# Patient Record
Sex: MALE | Race: WHITE
[De-identification: names, ages, dates, MRNs, and addresses within clinical notes are randomized per-mention and may not be internally consistent; named-entity substitution may affect disease eponyms.]

---

## 2017-01-19 ENCOUNTER — HOSPITAL ENCOUNTER (EMERGENCY)
Age: 32
Discharge: HOME | End: 2017-01-19
Payer: MEDICAID

## 2017-01-19 DIAGNOSIS — F17.200: ICD-10-CM

## 2017-01-19 DIAGNOSIS — M70.41: Primary | ICD-10-CM

## 2017-01-19 PROCEDURE — 99283 EMERGENCY DEPT VISIT LOW MDM: CPT

## 2017-08-15 ENCOUNTER — HOSPITAL ENCOUNTER (EMERGENCY)
Dept: HOSPITAL 76 - ED | Age: 32
Discharge: HOME | End: 2017-08-15
Payer: MEDICAID

## 2017-08-15 VITALS — SYSTOLIC BLOOD PRESSURE: 124 MMHG | DIASTOLIC BLOOD PRESSURE: 71 MMHG
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
## 2017-08-15 DIAGNOSIS — F17.200: ICD-10-CM

## 2017-08-15 DIAGNOSIS — S81.802A: Primary | ICD-10-CM

## 2017-08-15 DIAGNOSIS — L08.9: ICD-10-CM

## 2017-08-15 PROCEDURE — 99283 EMERGENCY DEPT VISIT LOW MDM: CPT

## 2017-08-15 PROCEDURE — 97597 DBRDMT OPN WND 1ST 20 CM/<: CPT
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
## 2018-01-01 ENCOUNTER — HOSPITAL ENCOUNTER (EMERGENCY)
Dept: HOSPITAL 76 - ED | Age: 33
Discharge: HOME | End: 2018-01-01
Payer: MEDICAID

## 2018-01-01 VITALS — SYSTOLIC BLOOD PRESSURE: 120 MMHG | DIASTOLIC BLOOD PRESSURE: 67 MMHG
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
## 2018-01-01 DIAGNOSIS — F17.200: ICD-10-CM

## 2018-01-01 DIAGNOSIS — S43.101A: Primary | ICD-10-CM
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
## 2018-01-01 PROCEDURE — 73000 X-RAY EXAM OF COLLAR BONE: CPT

## 2018-01-01 PROCEDURE — 73030 X-RAY EXAM OF SHOULDER: CPT

## 2018-01-01 PROCEDURE — 99283 EMERGENCY DEPT VISIT LOW MDM: CPT
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
## 2018-01-01 NOTE — XRAY REPORT
EXAM:

RIGHT CLAVICLE RADIOGRAPHY

 

EXAM DATE: 1/1/2018 01:45 AM.

 

CLINICAL HISTORY: Pain after injury.

 

COMPARISON: None.

 

TECHNIQUE: 2 views.

 

FINDINGS: 

Bones: No fracture seen.

 

Joints: Stage 2 to 3 acromioclavicular separation.

 

Soft Tissues: Mild soft tissue swelling.

 

IMPRESSION: 

1. Stage 2-3 AC separation.

 

[facility]

Referring Provider Line: [phone]

 

SITE ID: [id number]

## 2018-01-01 NOTE — XRAY REPORT
EXAM:

RIGHT SHOULDER RADIOGRAPHY

 

EXAM DATE: 1/1/2018 01:45 AM.

 

CLINICAL HISTORY: Pain after injury.

 

COMPARISON: None.

 

TECHNIQUE: 3 views.

 

FINDINGS: 

Bones: No acute fracture seen.

 

Joints: Glenohumeral joint is unremarkable. There is stage 2 to 3 acromioclavicular joint separation.


 

Soft tissues: Visualized hemithorax is unremarkable.

 

IMPRESSION: 

1. No acute fracture seen. 

2. Stage 2-3 AC separation.

 

[facility]

Referring Provider Line: [phone]

 

SITE ID: [id number]

## 2018-01-01 NOTE — XRAY PRELIMINARY REPORT
Accession: [id number]

Exam: XR SHOULDER 3 VIEW RT

 

IMPRESSION: 

1. No acute fracture seen. 

2. Stage 2-3 AC separation.

 

[facility]

 

SITE ID: [id number]

## 2018-08-04 ENCOUNTER — HOSPITAL ENCOUNTER (EMERGENCY)
Dept: HOSPITAL 76 - ED | Age: 33
Discharge: HOME | End: 2018-08-04
Payer: MEDICAID

## 2018-08-04 VITALS — DIASTOLIC BLOOD PRESSURE: 58 MMHG | SYSTOLIC BLOOD PRESSURE: 116 MMHG

## 2018-08-04 DIAGNOSIS — K04.7: ICD-10-CM

## 2018-08-04 DIAGNOSIS — F17.200: Primary | ICD-10-CM

## 2018-08-04 PROCEDURE — 99283 EMERGENCY DEPT VISIT LOW MDM: CPT

## 2018-08-04 NOTE — ED PHYSICIAN DOCUMENTATION
History of Present Illness





- Stated complaint


Stated Complaint: TOOTH PX





- Chief complaint


Chief Complaint: Heent





- Additonal information


Additional information: 





33 male


with dental decay now with dental pain


no fever but sweats last night


otherwise well





Review of Systems


Constitutional: denies: Fever, Chills


Throat: reports: Dental pain / toothache


Cardiac: denies: Chest pain / pressure


Respiratory: denies: Cough


GI: denies: Abdominal Pain, Vomiting





PD PAST MEDICAL HISTORY





- Past Medical History


Past Medical History: Yes


GI: GERD


Psych: Depression





- Past Surgical History


Past Surgical History: Yes


General: Colonoscopy





- Present Medications


Home Medications: 


 Ambulatory Orders











 Medication  Instructions  Recorded  Confirmed


 


Escitalopram [Lexapro] 20 mg PO DAILY 08/15/17 01/01/18


 


Amoxicillin 500 mg PO Q8H #30 capsule 08/04/18 














- Allergies


Allergies/Adverse Reactions: 


 Allergies











Allergy/AdvReac Type Severity Reaction Status Date / Time


 


No Known Drug Allergies Allergy   Verified 08/04/18 13:30














- Social History


Does the pt smoke?: Yes


Smoking Status: Current every day smoker


Does the pt drink ETOH?: No


Does the pt have substance abuse?: No





- Immunizations


Immunizations are current?: No


Immunizations: TDAP >10years/unknown





- POLST


Patient has POLST: No





PD ED PE NORMAL





- Vitals


Vital signs reviewed: Yes





- HEENT


HEENT: Atraumatic, Other (upper right molar decayed to the gum line, tender, 

werythema to surrounding gum but no visible abscess to drain, no trismus, some 

cheek swelling, no submandibular sweeling or neck swelling)





- Cardiac


Cardiac: RRR





- Respiratory


Respiratory: No respiratory distress, Clear bilaterally





- Neuro


Neuro: Alert and oriented X 3





Results





- Vitals


Vitals: 





 Vital Signs - 24 hr











  08/04/18





  13:27


 


Temperature 36.0 C L


 


Heart Rate 84


 


Respiratory 20





Rate 


 


Blood Pressure 116/58 L


 


O2 Saturation 98








 Oxygen











O2 Source                      Room air

















PD MEDICAL DECISION MAKING





- Sepsis Event


Vital Signs: 





 Vital Signs - 24 hr











  08/04/18





  13:27


 


Temperature 36.0 C L


 


Heart Rate 84


 


Respiratory 20





Rate 


 


Blood Pressure 116/58 L


 


O2 Saturation 98








 Oxygen











O2 Source                      Room air

















Departure





- Departure


Disposition: 01 Home, Self Care


Clinical Impression: 


 Dental infection





Condition: Good


Instructions:  ED Abscess Dental


Follow-Up: 


Grupo Waterman MD [Primary Care Provider] - 


Prescriptions: 


Amoxicillin 500 mg PO Q8H #30 capsule


Comments: 


The antibiotics will help but only temporarily - you still need to see a 

dentist to have the decayed tooth pulled

## 2019-04-11 ENCOUNTER — HOSPITAL ENCOUNTER (OUTPATIENT)
Dept: HOSPITAL 76 - DI | Age: 34
Discharge: HOME | End: 2019-04-11
Attending: NURSE PRACTITIONER
Payer: MEDICAID

## 2019-04-11 DIAGNOSIS — M48.8X6: ICD-10-CM

## 2019-04-11 DIAGNOSIS — R22.2: Primary | ICD-10-CM

## 2019-04-11 DIAGNOSIS — M40.294: Primary | ICD-10-CM

## 2019-04-11 DIAGNOSIS — R22.2: ICD-10-CM

## 2019-04-11 PROCEDURE — 76604 US EXAM CHEST: CPT

## 2019-04-11 PROCEDURE — 72070 X-RAY EXAM THORAC SPINE 2VWS: CPT

## 2019-04-11 PROCEDURE — 72100 X-RAY EXAM L-S SPINE 2/3 VWS: CPT

## 2019-04-11 NOTE — XRAY REPORT
Reason:  BACK PAIN,THORACIC REGION,RIGHT,LUMBAGO

Procedure Date:  04/11/2019   

Accession Number:  [id number] / [id number]                    

Procedure:  XR  - Lumbar Spine 2 View CPT Code:  

 

FULL RESULT:

 

 

EXAM:

LUMBOSACRAL SPINE RADIOGRAPHY

 

EXAM DATE: 4/11/2019 08:03 AM.

 

CLINICAL HISTORY: Back pain, thoracic region, right, lumbago.

 

COMPARISONS: None.

 

TECHNIQUE: 2 views.

 

FINDINGS:

Alignment: Normal. No spondylolisthesis or scoliosis.

 

Bones: Five non-rib-bearing lumbar vertebral bodies are present. No 

fractures or bone lesions.

 

Disks: Disk space heights are generally preserved with mild marginal 

osteophytosis at L1-L2.

 

Facets: There is a pars defect at L5 with suggestion of incomplete fusion 

of the posterior elements on the AP view. Mild facet arthropathy at this 

level.

 

Sacroiliac Joints: Unremarkable.

 

Soft Tissues: Normal. The visualized bowel gas pattern is normal.

IMPRESSION: L5 pars defect.

 

[facility]

## 2019-04-11 NOTE — XRAY REPORT
Reason:  BACK PAIN,THORACIC REGION,RIGHT,LUMBAGO

Procedure Date:  04/11/2019   

Accession Number:  [id number] / [id number]                    

Procedure:  XR  - Thoracic Spine 2 View CPT Code:  

 

FULL RESULT:

 

 

EXAM:

THORACIC SPINE RADIOGRAPHY

 

EXAM DATE: 4/11/2019 08:02 AM.

 

CLINICAL HISTORY: Back pain, thoracic region, right, lumbago.

 

COMPARISON: None.

 

TECHNIQUE: 2 views.

 

FINDINGS:

Alignment: Mild thoracic kyphosis is noted. No spondylolisthesis or 

scoliosis.

 

Bones: No fractures or bone lesions.

 

Disks: Normal. Disk heights are maintained.

 

Soft Tissues: Normal. The visualized lungs and cardiomediastinal 

silhouette are normal.

IMPRESSION: Mild thoracic kyphosis.

 

[facility]

## 2019-04-11 NOTE — ULTRASOUND REPORT
Reason:  SUBCUTANEOUS MASS OF BACK

Procedure Date:  04/11/2019   

Accession Number:  [id number] / [id number]                    

Procedure:  US  - Chest CPT Code:  

 

FULL RESULT:

 

 

EXAM:

LIMITED SOFT TISSUE ULTRASOUND ON THE LEFT LOWER BACK.

 

EXAM DATE: 4/11/2019 07:51 AM.

 

CLINICAL HISTORY: Subcutaneous mass of back.

 

COMPARISON: CHEST 04/10/2019 7:30 AM.

 

TECHNIQUE: Grayscale and limited color Doppler imaging of the area of 

concern along the left lower back of the patient was performed.

 

FINDINGS: The area of concern with the palpable finding demonstrates a 

wider than tall, relatively well demarcated ovoid-appearing hypoechoic 

mass with echotexture similar to surrounding background subcutaneous 

adipose tissue which measures 1.6 x 0.4 x 1.0 cm and demonstrates 

vascular supply centrally by limited color Doppler.

IMPRESSION: Suspect lipoma.

 

[facility]

## 2019-07-24 ENCOUNTER — HOSPITAL ENCOUNTER (OUTPATIENT)
Dept: HOSPITAL 76 - DI.N | Age: 34
Discharge: HOME | End: 2019-07-24
Attending: FAMILY MEDICINE
Payer: MEDICAID

## 2019-07-24 DIAGNOSIS — M25.512: Primary | ICD-10-CM

## 2019-07-25 NOTE — XRAY REPORT
Reason:  JOINT PAIN

Procedure Date:  07/24/2019   

Accession Number:  [id number] / [id number]                    

Procedure:  XRN - Shoulder 3 View LT CPT Code:  

 

FULL RESULT:

 

 

EXAM:

LEFT SHOULDER RADIOGRAPHY

 

EXAM DATE: 7/24/2019 01:55 PM.

 

CLINICAL HISTORY: JOINT PAIN.

 

COMPARISON: CLAVICLE RT 01/01/2018 1:45 AM.

 

TECHNIQUE: 3 views.

 

FINDINGS:

Bones: Normal. No fracture or bone lesion.

 

Joints: The glenohumeral and acromioclavicular joints are normal.

 

Soft tissues: The visualized left hemithorax is unremarkable. No soft 

tissue swelling.

IMPRESSION:

No evidence of acute pathology in the left shoulder.

 

[facility]

## 2021-06-22 ENCOUNTER — HOSPITAL ENCOUNTER (EMERGENCY)
Dept: HOSPITAL 76 - ED | Age: 36
Discharge: HOME | End: 2021-06-22
Payer: MEDICAID

## 2021-06-22 VITALS — DIASTOLIC BLOOD PRESSURE: 86 MMHG | SYSTOLIC BLOOD PRESSURE: 136 MMHG

## 2021-06-22 DIAGNOSIS — F17.200: ICD-10-CM

## 2021-06-22 DIAGNOSIS — Z87.11: ICD-10-CM

## 2021-06-22 DIAGNOSIS — K29.00: Primary | ICD-10-CM

## 2021-06-22 DIAGNOSIS — Z53.29: ICD-10-CM

## 2021-06-22 PROCEDURE — 99283 EMERGENCY DEPT VISIT LOW MDM: CPT

## 2021-06-22 PROCEDURE — 99284 EMERGENCY DEPT VISIT MOD MDM: CPT

## 2021-06-22 NOTE — ED PHYSICIAN DOCUMENTATION
PD HPI ABD PAIN





- Stated complaint


Stated Complaint: N/V FEVER/CHILLS





- Chief complaint


Chief Complaint: Abd Pain





- History obtained from


History obtained from: Patient, Friend





- History of Present Illness


Timing - onset: How many days ago (3-4)


Timing - duration: Days (3-4)


Timing - details: Gradual onset, Still present, Waxing and waning


Quality: Cramping, Aching, Pain


Location: Epigastric, LUQ.  No: RUQ, RLQ


Radiation: No: Chest, Lower back, Left flank


Worsened by: Eating


Associated symptoms: Nausea, Vomiting (several times, bilious without blood nor 

coffeegrounds.), Melena (he states one of his stools was dark but not 

consistently so.).  No: Fever, Hematemesis, Diarrhea (but has had softer stools)


Similar symptoms before: No diagnosis (he has had epigastric pains before, Dx 

lkely gastritis. Had been on Pantoprazole with good effect, but PMD did not 

continue it due to (as patient said) "you are not supposed to be on those 

medications longer than a few months". He has not been on it for over 6-8 

months.)


Recently seen: Not recently seen





Review of Systems


Constitutional: reports: Fever (subjective, intermittent the past couple days), 

Chills


Nose: denies: Rhinorrhea / runny nose, Congestion


Throat: denies: Sore throat


Respiratory: denies: Cough


GI: reports: Abdominal Pain, Nausea, Vomiting (bilious without 

blood/coffeeground appearance.).  denies: Constipation, Hematemesis


: denies: Dysuria, Frequency


Neurologic: reports: Generalized weakness.  denies: Focal weakness, Numbness, 

Near syncope


Endocrine: denies: Weight loss, Weight gain





PD PAST MEDICAL HISTORY





- Past Medical History


Cardiovascular: None


Respiratory: None


Endocrine/Autoimmune: None


GI: GERD, Ulcers


Psych: Depression





- Past Surgical History


Past Surgical History: Yes


General: Colonoscopy





- Present Medications


Home Medications: 


                                Ambulatory Orders











 Medication  Instructions  Recorded  Confirmed


 


Escitalopram [Lexapro] 20 mg PO DAILY 08/15/17 01/01/18


 


Amoxicillin 500 mg PO Q8H #30 capsule 08/04/18 


 


Hydrocodone/Acetaminophen [Norco 1 each PO Q6H PRN #15 tablet 11/12/19 





5-325 Tablet]   


 


Naproxen 500 mg PO BID #20 tablet 11/12/19 


 


Acetaminophen [Acetaminophen Extra 500 mg PO QID #50 tablet 06/22/21 





Strength]   


 


Lidocaine Viscous 2% [Xylocaine 5 ml PO Q4H PRN #100 ml 06/22/21 





Viscous 2%]   


 


Ondansetron Odt [Zofran] 4 mg TL Q6H PRN #20 tablet 06/22/21 


 


Pantoprazole [Protonix] 40 mg PO DAILY 30 Days #30 tablet 06/22/21 














- Allergies


Allergies/Adverse Reactions: 


                                    Allergies











Allergy/AdvReac Type Severity Reaction Status Date / Time


 


No Known Drug Allergies Allergy   Verified 06/22/21 09:12














- Social History


Does the pt smoke?: Yes


Smoking Status: Current every day smoker


Does the pt drink ETOH?: No


Does the pt have substance abuse?: No





- Immunizations


Immunizations are current?: No


Immunizations: TDAP >10years/unknown





- POLST


Patient has POLST: No





PD ED PE NORMAL





- Vitals


Vital signs reviewed: Yes





- General


General: Alert and oriented X 3, No acute distress, Well developed/nourished





- HEENT


HEENT: Pharynx benign





- Neck


Neck: Supple, no meningeal sign, No adenopathy





- Cardiac


Cardiac: RRR, No murmur





- Respiratory


Respiratory: Clear bilaterally





- Abdomen


Abdomen: Normal bowel sounds, Soft, Non distended, No organomegaly, Other 

(tender epigastric to LUQ area without guarding/percussion nor rebound 

tenderness. Lower abd not tender. )





- Rectal


Rectal: Deferred





- Back


Back: No CVA TTP





- Derm


Derm: Normal color, Warm and dry





- Neuro


Neuro: Alert and oriented X 3, No motor deficit, Normal speech





Results





- Vitals


Vitals: 


                                     Oxygen











O2 Source                      Room air

















PD MEDICAL DECISION MAKING





- ED course


Complexity details: re-evaluated patient (he is considerably afraid of needles, 

so refuses lab draw nor IV start, even with offer of PO Ativan or such prior. 

Will have to guess diagnosis without testing. Seems likely gastritis/ulcer. 

Consider diverticular. Does not seem surgical abd nor in appendix/GB area, so I 

feel okay w/o labs for now.), considered differential (non peritoneal exam. 

Location epigastric/LUQ, with pattern more like ulcer/gastritis. Consider 

diverticular, colitis, UC as other possibilities. ), d/w patient





Departure





- Departure


Disposition: 01 Home, Self Care


Clinical Impression: 


 Left upper quadrant abdominal pain





Gastritis, acute


Qualifiers:


 Gastritis type: unspecified gastritis Gastritis bleeding: without bleeding 

Qualified Code(s): K29.00 - Acute gastritis without bleeding





Condition: Stable


Record reviewed to determine appropriate education?: Yes


Instructions:  ED Gastritis


Follow-Up: 


[facility] [Provider Group]


[facility] [Provider Group]


Prescriptions: 


Acetaminophen [Acetaminophen Extra Strength] 500 mg PO QID #50 tablet


Pantoprazole [Protonix] 40 mg PO DAILY 30 Days #30 tablet


Lidocaine Viscous 2% [Xylocaine Viscous 2%] 5 ml PO Q4H PRN #100 ml


 PRN Reason: Pain


Ondansetron Odt [Zofran] 4 mg TL Q6H PRN #20 tablet


 PRN Reason: Nausea / Vomiting


Comments: 


This seems most likely gastritis or ulcer related to the irritations of the 

stomach and exacerbated by the recent NSAID use.





We will treat this with the pantoprazole daily as well as ondansetron for 

nausea.  Use antacids such as Maalox or Mylanta combined with the lidocaine if 

needed for discomfort/pain.





Avoid NSAIDs alcohol and minimize caffeine to reduce irritation of the stomach.





Use Tylenol every 4-6 hours as needed for pain instead.





Return if persistent vomiting or increased pain, vomiting blood, fever, or other

 concerns. If persistent, then we would want to do some blood tests and CT scan,

 so be prepared for those if you need. Otherwise follow up with Primary Care 

providers for ongoing care, or can follow up in Walk In clinic in the short 

term.    


Discharge Date/Time: 06/22/21 11:41

## 2021-12-16 ENCOUNTER — HOSPITAL ENCOUNTER (OUTPATIENT)
Dept: HOSPITAL 76 - DI.N | Age: 36
Discharge: HOME | End: 2021-12-16
Attending: FAMILY MEDICINE
Payer: MEDICAID

## 2021-12-16 DIAGNOSIS — M43.16: Primary | ICD-10-CM

## 2021-12-17 NOTE — XRAY REPORT
PROCEDURE:  Lumbar Spine Complete

 

INDICATIONS:  LUMBAR SPONDYLOLISTHESIS

 

TECHNIQUE:  5 views of the lumbar spine were acquired.  

 

COMPARISON:  4/11/2019

 

FINDINGS:  

 

Bones:  5 non-rib-bearing vertebrae are present.  Bilateral L5 pars defects. Grade 1 anterolisthesis 
L5 on S1. Trace retrolisthesis L4 on 5. Mild disc height loss at L1-2 with mild endplate spur formati
on. Other disc spaces are well-maintained. Mild posterior vertebral body height loss of L5 appears ch
ronic. No acute vertebral body compression fractures.  No suspicious bony lesions.  

 

Soft tissues:  Overlying bowel gas pattern is normal.  No suspicious soft tissue calcifications.  

 

IMPRESSION:  

1. Mild listhesis L4-5 and L5-S1. Stable compared to the prior study.

2. Mild disc and endplate change L1-2, also stable.

3. L5 pars defects.

 

Reviewed by: Shila Allison MD on 12/17/2021 9:09 AM PST

Approved by: Shila Allison MD on 12/17/2021 9:09 AM PST

 

 

Station ID:  [id number]